# Patient Record
Sex: MALE | Race: WHITE | NOT HISPANIC OR LATINO | URBAN - METROPOLITAN AREA
[De-identification: names, ages, dates, MRNs, and addresses within clinical notes are randomized per-mention and may not be internally consistent; named-entity substitution may affect disease eponyms.]

---

## 2018-05-02 ENCOUNTER — RECORDS - HEALTHEAST (OUTPATIENT)
Dept: GENERAL RADIOLOGY | Facility: CLINIC | Age: 33
End: 2018-05-02

## 2018-05-02 ENCOUNTER — COMMUNICATION - HEALTHEAST (OUTPATIENT)
Dept: TELEHEALTH | Facility: CLINIC | Age: 33
End: 2018-05-02

## 2018-05-02 ENCOUNTER — COMMUNICATION - HEALTHEAST (OUTPATIENT)
Dept: FAMILY MEDICINE | Facility: CLINIC | Age: 33
End: 2018-05-02

## 2018-05-02 ENCOUNTER — OFFICE VISIT - HEALTHEAST (OUTPATIENT)
Dept: FAMILY MEDICINE | Facility: CLINIC | Age: 33
End: 2018-05-02

## 2018-05-02 DIAGNOSIS — R63.4 ABNORMAL WEIGHT LOSS: ICD-10-CM

## 2018-05-02 DIAGNOSIS — Z13.220 LIPID SCREENING: ICD-10-CM

## 2018-05-02 DIAGNOSIS — R63.4 WEIGHT LOSS, UNINTENTIONAL: ICD-10-CM

## 2018-05-02 DIAGNOSIS — F12.10 MARIJUANA ABUSE: ICD-10-CM

## 2018-05-02 DIAGNOSIS — K62.5 RECTAL BLEEDING: ICD-10-CM

## 2018-05-02 DIAGNOSIS — K62.89 RECTAL MASS: ICD-10-CM

## 2018-05-02 DIAGNOSIS — Z00.00 ROUTINE GENERAL MEDICAL EXAMINATION AT A HEALTH CARE FACILITY: ICD-10-CM

## 2018-05-02 LAB
ALBUMIN SERPL-MCNC: 3.9 G/DL (ref 3.5–5)
ALBUMIN UR-MCNC: NEGATIVE MG/DL
ALP SERPL-CCNC: 99 U/L (ref 45–120)
ALT SERPL W P-5'-P-CCNC: 11 U/L (ref 0–45)
ANION GAP SERPL CALCULATED.3IONS-SCNC: 8 MMOL/L (ref 5–18)
APPEARANCE UR: CLEAR
AST SERPL W P-5'-P-CCNC: 16 U/L (ref 0–40)
BASOPHILS # BLD AUTO: 0.1 THOU/UL (ref 0–0.2)
BASOPHILS NFR BLD AUTO: 1 % (ref 0–2)
BILIRUB SERPL-MCNC: 0.7 MG/DL (ref 0–1)
BILIRUB UR QL STRIP: NEGATIVE
BUN SERPL-MCNC: 9 MG/DL (ref 8–22)
C REACTIVE PROTEIN LHE: 0.1 MG/DL (ref 0–0.8)
CALCIUM SERPL-MCNC: 9.7 MG/DL (ref 8.5–10.5)
CHLORIDE BLD-SCNC: 107 MMOL/L (ref 98–107)
CHOLEST SERPL-MCNC: 148 MG/DL
CO2 SERPL-SCNC: 27 MMOL/L (ref 22–31)
COLOR UR AUTO: YELLOW
CREAT SERPL-MCNC: 0.87 MG/DL (ref 0.7–1.3)
EOSINOPHIL # BLD AUTO: 0.2 THOU/UL (ref 0–0.4)
EOSINOPHIL NFR BLD AUTO: 2 % (ref 0–6)
ERYTHROCYTE [DISTWIDTH] IN BLOOD BY AUTOMATED COUNT: 11.7 % (ref 11–14.5)
ERYTHROCYTE [SEDIMENTATION RATE] IN BLOOD BY WESTERGREN METHOD: 8 MM/HR (ref 0–15)
FASTING STATUS PATIENT QL REPORTED: YES
GFR SERPL CREATININE-BSD FRML MDRD: >60 ML/MIN/1.73M2
GLUCOSE BLD-MCNC: 96 MG/DL (ref 70–125)
GLUCOSE UR STRIP-MCNC: NEGATIVE MG/DL
HBA1C MFR BLD: 5.4 % (ref 3.5–6)
HCT VFR BLD AUTO: 45.1 % (ref 40–54)
HDLC SERPL-MCNC: 42 MG/DL
HGB BLD-MCNC: 15.5 G/DL (ref 14–18)
HGB UR QL STRIP: NEGATIVE
HIV 1+2 AB+HIV1 P24 AG SERPL QL IA: NEGATIVE
KETONES UR STRIP-MCNC: NEGATIVE MG/DL
LDLC SERPL CALC-MCNC: 87 MG/DL
LEUKOCYTE ESTERASE UR QL STRIP: NEGATIVE
LIPASE SERPL-CCNC: 9 U/L (ref 0–52)
LYMPHOCYTES # BLD AUTO: 3.1 THOU/UL (ref 0.8–4.4)
LYMPHOCYTES NFR BLD AUTO: 33 % (ref 20–40)
MCH RBC QN AUTO: 31.8 PG (ref 27–34)
MCHC RBC AUTO-ENTMCNC: 34.4 G/DL (ref 32–36)
MCV RBC AUTO: 92 FL (ref 80–100)
MONOCYTES # BLD AUTO: 0.7 THOU/UL (ref 0–0.9)
MONOCYTES NFR BLD AUTO: 7 % (ref 2–10)
NEUTROPHILS # BLD AUTO: 5.4 THOU/UL (ref 2–7.7)
NEUTROPHILS NFR BLD AUTO: 58 % (ref 50–70)
NITRATE UR QL: NEGATIVE
PH UR STRIP: 5.5 [PH] (ref 5–8)
PLATELET # BLD AUTO: 164 THOU/UL (ref 140–440)
PMV BLD AUTO: 10.1 FL (ref 7–10)
POTASSIUM BLD-SCNC: 3.8 MMOL/L (ref 3.5–5)
PROT SERPL-MCNC: 7.1 G/DL (ref 6–8)
RBC # BLD AUTO: 4.88 MILL/UL (ref 4.4–6.2)
SODIUM SERPL-SCNC: 142 MMOL/L (ref 136–145)
SP GR UR STRIP: >=1.03 (ref 1–1.03)
TRIGL SERPL-MCNC: 94 MG/DL
TSH SERPL DL<=0.005 MIU/L-ACNC: 2.52 UIU/ML (ref 0.3–5)
UROBILINOGEN UR STRIP-ACNC: NORMAL
WBC: 9.3 THOU/UL (ref 4–11)

## 2018-05-02 ASSESSMENT — MIFFLIN-ST. JEOR: SCORE: 1627.1

## 2018-05-04 LAB
HAV IGM SERPL QL IA: NEGATIVE
HBV CORE IGM SERPL QL IA: NEGATIVE
HBV SURFACE AG SERPL QL IA: NEGATIVE
HCV AB SERPL QL IA: NEGATIVE

## 2018-05-21 ENCOUNTER — OFFICE VISIT - HEALTHEAST (OUTPATIENT)
Dept: FAMILY MEDICINE | Facility: CLINIC | Age: 33
End: 2018-05-21

## 2018-05-21 ENCOUNTER — RECORDS - HEALTHEAST (OUTPATIENT)
Dept: ADMINISTRATIVE | Facility: OTHER | Age: 33
End: 2018-05-21

## 2018-05-21 DIAGNOSIS — G89.29 CHRONIC BILATERAL LOW BACK PAIN WITHOUT SCIATICA: ICD-10-CM

## 2018-05-21 DIAGNOSIS — M54.50 CHRONIC BILATERAL LOW BACK PAIN WITHOUT SCIATICA: ICD-10-CM

## 2018-05-21 RX ORDER — CYCLOBENZAPRINE HCL 5 MG
5-10 TABLET ORAL 3 TIMES DAILY PRN
Qty: 30 TABLET | Refills: 0 | Status: SHIPPED | OUTPATIENT
Start: 2018-05-21

## 2018-05-21 ASSESSMENT — MIFFLIN-ST. JEOR: SCORE: 1640.25

## 2018-06-11 ENCOUNTER — OFFICE VISIT - HEALTHEAST (OUTPATIENT)
Dept: PHYSICAL THERAPY | Facility: REHABILITATION | Age: 33
End: 2018-06-11

## 2018-06-11 DIAGNOSIS — M62.81 MUSCLE WEAKNESS (GENERALIZED): ICD-10-CM

## 2018-06-11 DIAGNOSIS — M54.6 PAIN IN THORACIC SPINE: ICD-10-CM

## 2021-06-01 VITALS — HEIGHT: 72 IN | BODY MASS INDEX: 19.77 KG/M2 | WEIGHT: 146 LBS

## 2021-06-01 VITALS — HEIGHT: 72 IN | WEIGHT: 143.1 LBS | BODY MASS INDEX: 19.38 KG/M2

## 2021-06-16 PROBLEM — F12.10 MARIJUANA ABUSE: Status: ACTIVE | Noted: 2018-05-02

## 2021-06-16 PROBLEM — K62.5 RECTAL BLEEDING: Status: ACTIVE | Noted: 2018-05-02

## 2021-06-16 PROBLEM — R63.4 WEIGHT LOSS, UNINTENTIONAL: Status: ACTIVE | Noted: 2018-05-02

## 2021-06-17 NOTE — PROGRESS NOTES
Assessment and Plan:     1. Routine general medical examination at a health care facility  Discussed consuming a healthy diet and exercising.  Discussed importance routine sunscreen use.  Recommend taking a daily multivitamin.  He does not currently have insurance.  He declines Tdap vaccine.    2. Lipid screening  - Lipid Cascade    3. Marijuana abuse  I discouraged the use of this.  He declines need for treatment.    4. Weight loss, unintentional  5. Rectal mass  6. Rectal bleeding  We will rule out anemia, diabetes, liver kidney disease, thyroid disease, inflammation, hepatitis, cancer.  Patient complains of a rectal mass and that I am unable to visualize today.  Will refer to colorectal surgery for further evaluation.  He has some rectal bleeding that he believes is from this.  If colorectal surgery believes it is a basic hemorrhoid, will refer to [facility] for further evaluation of underlying abdominal pain, rectal bleeding, and weight loss.  May consider CT of the abdomen for further evaluation. He is content with the plan.   - HM1(CBC and Differential)  - Comprehensive Metabolic Panel  - Glycosylated Hemoglobin A1c  - Urinalysis-UC if Indicated  - Thyroid Cascade  - Sedimentation Rate  - C-Reactive Protein  - Hepatitis Acute Evaluation  - XR Chest 2 Views; Future  - HM1 (CBC with Diff)  - Lipase  -HIV      Subjective:     Chilo is a 32 y.o. male presenting to the clinic for a male physical and other concerns today.  Patient is single and is not currently sexually sexually active.  He has no concerns for STDs.  Patient states over the past year, he has had lack of appetite.  He smokes marijuana in the afternoon and evenings to increase his appetite so he consumes 2 meals per day.  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  Patient states over the past year, he has had intermittent midepigastric and lower abdominal discomfort.  Patient noted blood in his stool one year ago.   He does admit to constipation because he feels a bulge present within his rectum that inhibits him from passing stool.  He typically has 1 bowel movement per day. He believes the blood in the stool is from a possible hemorrhoid.  He denies any heartburn.  He has no dysuria, hematuria, dysphagia.  He has felt weak and fatigued.  He is sleeping well at night.  He denies any snoring.  He does admit to slight anxiety.  He consumes a 12 pack of Mountain Dew per day.  He did stop consuming energy drinks 2 years ago.  He has a history of alcohol abuse at age 24 which lasted for 2-3 years.  He has a history of opioid abuse 5 years ago.  Patient states he typically weighs around 160 and now weighs 143.  He is unsure of the duration of his weight loss.  He has always had difficulty gaining weight.    Review of Systems: A complete 14 point review of systems was obtained and is negative or as stated in the history of present illness.    Social History     Social History     Marital status: Single     Spouse name: N/A     Number of children: N/A     Years of education: N/A     Occupational History     Not on file.     Social History Main Topics     Smoking status: Not on file     Smokeless tobacco: Not on file     Alcohol use Not on file     Drug use: Not on file     Sexual activity: Not on file     Other Topics Concern     Not on file     Social History Narrative       Active Ambulatory Problems     Diagnosis Date Noted     No Active Ambulatory Problems     Resolved Ambulatory Problems     Diagnosis Date Noted     No Resolved Ambulatory Problems     No Additional Past Medical History       No family history on file.    Objective:     /60  Pulse 98  Ht 6' (1.829 m)  Wt 143 lb 1.6 oz (64.9 kg)  BMI 19.41 kg/m2    General Appearance:    Alert, cooperative, no distress, appears stated age   Head:    Normocephalic, without obvious abnormality, atraumatic   Eyes:    PERRL, conjunctiva/corneas clear, EOM's intact, fundi      benign, both eyes        Ears:    Normal TM's and external ear canals, both ears   Nose:   Nares normal, septum midline, mucosa normal, no drainage    or sinus tenderness   Throat:   Lips, mucosa, and tongue normal; teeth and gums normal   Neck:   Supple, symmetrical, trachea midline, no adenopathy;        thyroid:  No enlargement/tenderness/nodules; no carotid    bruit or JVD   Back:     Symmetric, no curvature, ROM normal, no CVA tenderness   Lungs:     Clear to auscultation bilaterally, respirations unlabored   Chest wall:    No tenderness or deformity   Heart:    Regular rate and rhythm, S1 and S2 normal, no murmur, rub    or gallop   Abdomen:     Soft, non-tender, bowel sounds active all four quadrants,     no masses, no organomegaly   Genitalia:    Normal male without lesion, discharge or tenderness   Rectal:    No obvious rectal hemorrhoid is present.  Patient refused anoscopy.     Extremities:   Extremities normal, atraumatic, no cyanosis or edema   Pulses:   2+ and symmetric all extremities   Skin:   Skin color, texture, turgor normal, no rashes or lesions   Lymph nodes:   Cervical, supraclavicular, and axillary nodes normal   Neurologic:   CNII-XII intact. Normal strength, sensation and reflexes       throughout       LABORATORY: I ordered and personally reviewed chest x-ray showing no obvious infiltrate or nodularity.  Will have radiology review.

## 2021-06-18 NOTE — PROGRESS NOTES
"Assessment and Plan:     1. Chronic bilateral low back pain without sciatica  Differentials include myofascial pain, bulging or herniated disc, spinal stenosis.  Discussed symptomatic treatment including rest, ice, stretching activities.  Will treat with cyclobenzaprine.  Educated on its indications and side effects.  Patient is to avoid other taking sedatives with cyclobenzaprine.  Will refer to PT for further evaluation and treatment.  Offered x-ray, MRI, referral to spine clinic, but he declines.  The patient is content with the plan.   - Ambulatory referral to PT/OT  - cyclobenzaprine (FLEXERIL) 5 MG tablet; Take 1-2 tablets (5-10 mg total) by mouth 3 (three) times a day as needed.  Dispense: 30 tablet; Refill: 0    Subjective:     Chilo is a 32 y.o. male presenting to the clinic for concerns for chronic back pain.  Patient states at the age of 17, he was cross checked while playing hockey and he flew over the hockey boards.  He saw a chiropractor who told him that he had \"a jammed disc.\" Patient has been diagnosed with mild scoliosis in the past.  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  He developed lower lumbar pain which he describes as a constant ache.  Picking up objects exacerbates the pain.  He does have numbness and tingling in bilateral anterior thighs.  He denies any urinary or fecal incontinence or retention.  He took some of his brother's Tramadol which provided relief.  Patient states he has flares of pain every 1-2 months.    Review of Systems: A complete 14 point review of systems was obtained and is negative or as stated in the history of present illness.    Social History     Social History     Marital status: Single     Spouse name: N/A     Number of children: N/A     Years of education: N/A     Occupational History     Not on file.     Social History Main Topics     Smoking status: Current Every Day Smoker     Packs/day: 0.50     Smokeless tobacco: Never " Used     Alcohol use 0.6 oz/week     1 Cans of beer per week     Drug use: Yes     Special: Marijuana     Sexual activity: Not on file      Comment: single     Other Topics Concern     Not on file     Social History Narrative       Active Ambulatory Problems     Diagnosis Date Noted     Marijuana abuse 05/02/2018     Weight loss, unintentional 05/02/2018     Rectal bleeding 05/02/2018     Resolved Ambulatory Problems     Diagnosis Date Noted     No Resolved Ambulatory Problems     No Additional Past Medical History       Family History   Problem Relation Age of Onset     No Medical Problems Mother      Diabetes Father      Hyperlipidemia Father      Diabetes Maternal Grandfather      Heart disease Paternal Grandfather      Acute Myocardial Infarction Paternal Grandfather        Objective:     /64  Pulse 96  Ht 6' (1.829 m)  Wt 146 lb (66.2 kg)  BMI 19.8 kg/m2    Patient is alert, in no obvious distress.   Skin: Warm, dry.   Lungs:  Clear to auscultation. Respirations even and unlabored.  No wheezing or rales noted.   Heart:  Regular rate and rhythm.  No murmurs.  Musculoskeletal: He has full range of motion bending at the waist.  He has no difficulty twisting turning.  He is able to heel and toe walk without difficulty.  Patellar and Achilles reflexes +2, symmetrical.  Straight leg raise is negative bilaterally.  He is nontender to palpation of the paraspinous muscles in the thoracic and lumbar musculature.  He has a mild curvature of the spine to the right within his mid thoracic region.

## 2021-06-18 NOTE — PROGRESS NOTES
[facility] Discharge Summary  Patient Name: Chilo Mills  Date: 8/17/2018  Referral Diagnosis: Chronic Low back pain without sciatica   Referring provider: Charmaine Maloney CNP  Visit Diagnosis:   1. Pain in thoracic spine     2. Muscle weakness (generalized)         Goals:  Pt. will be independent with home exercise program in : 6 weeks  Pt. will have improved quality of sleep: with less pain;waking less times/night;in 6 weeks  Patient will work: without restrictions;in 6 weeks  Pt will: be able to lift >50# without increase in pain for increased ease in work actvitiies in 8 weeks    Patient was seen for evaluation only as he no showed his only scheduled follow up  The patient discontinued therapy, did not return.  No further therapy is required at this time.    Therapy will be discontinued at this time.  The patient will need a new referral to resume.    Thank you for your referral.  Elisabeth Neville  8/17/2018  9:13 AM    [facility]   [facility] Initial Evaluation    Patient Name: Chilo Mills  Date of evaluation: 6/11/2018  Referral Diagnosis: Chronic bilateral low back pain without sciatica  Referring provider: Charmaine Maloney CNP  Visit Diagnosis:     ICD-10-CM    1. Pain in thoracic spine M54.6    2. Muscle weakness (generalized) M62.81        Assessment:     Chilo Mills is a 32 y.o. male who presents to therapy today with chief complaints of mid back pain that has been on and off since he was a teenager. Pain is R > L side with a noted mild right convex curve in the mid thoracic spine. Patient will benefit from skilled PT intervention to decrease pain, decrease tension and increase ROM and strength to improve his sleep and ability to perform his work activities.     Impairments in  pain, posture, ROM, joint mobility, strength  The POC is dynamic and will be modified on an ongoing basis.  Barriers to achieving goals as noted in the assessment section may affect outcome.  Prognosis to  achieve goals is  good   Pt. is appropriate for skilled PT intervention as outlined in the Plan of Care (POC).  Pt. is a good candidate for skilled PT services to improve pain levels and function.    Goals:  Pt. will be independent with home exercise program in : 6 weeks  Pt. will have improved quality of sleep: with less pain;waking less times/night;in 6 weeks  Patient will work: without restrictions;in 6 weeks  Pt will: be able to lift >50# without increase in pain for increased ease in work actvitiies in 8 weeks    Patient's expectations/goals are realistic.    Barriers to Learning or Achieving Goals:  Chronicity of the problem.       Plan / Patient Instructions:        Plan of Care:   Authorization / Certification Start Date: 06/11/18  Communication with: Referral Source  Patient Related Instruction: Nature of Condition;Treatment plan and rationale;Self Care instruction;Basis of treatment;Body mechanics;Posture;Precautions;Next steps;Expected outcome  Times per Week: 1 every other week  Number of Weeks: up tp 12 weeks  Number of Visits: up to 6 sessions  Therapeutic Exercise: ROM;Stretching;Strengthening  Neuromuscular Reeducation: kinesio tape;posture;postural restoration;core  Manual Therapy: soft tissue mobilization;myofascial release;joint mobilization;muscle energy;strain counterstrain    Plan for next visit: review HEP and advance exercises, planks. Side planks thread the needle     Subjective:         Social information:   Living Situation:single family home   Occupation:window install    Work Status:Working full time   Equipment Available: None    History of Present Illness:    Chilo is a 32 y.o. male who presents to therapy today with complaints of chronic low back pain. Patient reports the pain started when he was playing hockey at 17 years old and was cross checked into the boards and went over them and he had seen a chiropractor for this. Patient reports he had been ok overall but the pain will flare  up here and there since then. Patient reports there was flare up 2 years ago which limited his ability to work and then again 3 months ago where he had been call into work. Patient reports he was told he had scoliosis and that is likely the cause of the back pain. No images on his spine. Patient reports pain is worse with lifting, pain is waking him up 3-4 times a night, the heavier the day the more pain at night.     Pain Ratin  Pain rating at best: 2  Pain rating at worst: 10  Pain description: constant dull tooth ache pain, then will get s sharp spasm pain    Functional limitations are described as occurring with:   lifting   Sleeping  Twisting   Turning        Objective:      Note: Items left blank indicates the item was not performed or not indicated at the time of the evaluation.    Patient Outcome Measures :    Modified Oswestry Low Back Pain Disablity Questionnaire  in %: 26   Scores range from 0-100%, where a score of 0% represents minimal pain and maximal function. The minimal clinically important difference is a score reduction of 12%.    Examination  1. Pain in thoracic spine     2. Muscle weakness (generalized)       Involved side: Right  Posture Observation:      General sitting posture is  normal.  General standing posture is normal.  Lumbopelvic complex: Mild scoliosis with convex to the right scapular border  Mildly decreased lumbar lordosis  Pelvic alignment: right iliac crest higher in standing     Lumbar ROM:  2018  Date: 2018     *Indicate scale AROM AROM AROM   Lumbar Flexion To ankles with increase tension in legs     Lumbar Extension WNL      Right Left Right Left Right Left   Lumbar Sidebending Min dec pain R  WNL tight R       Lumbar Rotation WNL WNL       Thoracic Flexion      Thoracic Extension      Thoracic Sidebending         Thoracic Rotation Min dec tight R Min+ dec pain and tight R side          Lower Extremity Strength:   2018  Date: 2018     LE strength/5  Right Left Right Left Right Left   Hip Flexion (L1-3) 5 5       Hip Extension (L5-S1) 5 5       Hip Abduction (L4-5) 5 5       Hip Adduction (L2-3) 5 5       Hip External Rotation 5 5       Hip Internal Rotation 5 5       Knee Extension (L3-4) 5 5       Knee Flexion 5 5       Ankle Dorsiflexion (L4-5) 5 5       Great Toe Extension (L5)         Ankle Plantar flexion (S1)         Abdominals      MMT of B UE demonstrate 5/5 strength and painfree     Sensation    WNL to light touch in B UEs  Reflex Testing: NT today     Palpation: mild tenderness along the medial scapular border and thoracic paraspinals     Lumbar Special Tests:   6/11/2018  Lumbar Special Tests Right Left SI Tests Right  Left   Quadrant test - - SI Compression     Straight leg raise - - SI Distraction     Crossover response - - POSH Test     Slump - - Sacral Thrust     Sit-up test  FADIR - -   Trunk extensor endurance test  Resisted Abduction - -   Prone instability test  Other: EMELIA - -   Pubic shotgun  Other:       Repeated Motion Testing:  Does not centralize  Does not peripheralize    Passive Mobility - Joint Integrity:  WNL    LE Screen:  WNL    Treatment Today  6/11/2018  TREATMENT MINUTES COMMENTS   Evaluation 30    Self-care/ Home management     Manual therapy     Neuromuscular Re-education     Therapeutic Activity     Therapeutic Exercises 25 Discussed POC and pathology. Review biomechanics of the spine and his lifting technique required for work of installing window and his mild scoliosis with convex into the same side as he is lifting the windows.   Initiated HEP and performed in clinic today  - reach and roll 10 sec hold x 5 reps each way  - band isometric trunk rotation in standing 10 sec hold x 10 reps each side  - counter top push up plus x 10 reps    Gait training     Modality__________________                Total 55    Blank areas are intentional and mean the treatment did not include these items.   PT Evaluation Code: (Please list  factors)  Patient History/Comorbidities: as above   Examination: as above   Clinical Presentation: stable   Clinical Decision Making: low     Patient History/  Comorbidities Examination  (body structures and functions, activity limitations, and/or participation restrictions) Clinical Presentation Clinical Decision Making (Complexity)   No documented Comorbidities or personal factors 1-2 Elements Stable and/or uncomplicated Low   1-2 documented comorbidities or personal factor 3 Elements Evolving clinical presentation with changing characteristics Moderate   3-4 documented comorbidities or personal factors 4 or more Unstable and unpredictable High     Elisabeth Neville PT, DPT   6/11/2018  7:08 AM

## 2021-06-26 ENCOUNTER — HEALTH MAINTENANCE LETTER (OUTPATIENT)
Age: 36
End: 2021-06-26

## 2021-10-16 ENCOUNTER — HEALTH MAINTENANCE LETTER (OUTPATIENT)
Age: 36
End: 2021-10-16

## 2022-07-17 ENCOUNTER — HEALTH MAINTENANCE LETTER (OUTPATIENT)
Age: 37
End: 2022-07-17

## 2022-09-25 ENCOUNTER — HEALTH MAINTENANCE LETTER (OUTPATIENT)
Age: 37
End: 2022-09-25

## 2023-08-05 ENCOUNTER — HEALTH MAINTENANCE LETTER (OUTPATIENT)
Age: 38
End: 2023-08-05